# Patient Record
Sex: MALE | Race: BLACK OR AFRICAN AMERICAN | NOT HISPANIC OR LATINO | URBAN - METROPOLITAN AREA
[De-identification: names, ages, dates, MRNs, and addresses within clinical notes are randomized per-mention and may not be internally consistent; named-entity substitution may affect disease eponyms.]

---

## 2017-09-27 ENCOUNTER — EMERGENCY (EMERGENCY)
Facility: HOSPITAL | Age: 32
LOS: 1 days | Discharge: HOME | End: 2017-09-27
Attending: EMERGENCY MEDICINE | Admitting: EMERGENCY MEDICINE
Payer: SELF-PAY

## 2017-09-27 VITALS
TEMPERATURE: 98 F | DIASTOLIC BLOOD PRESSURE: 86 MMHG | RESPIRATION RATE: 16 BRPM | OXYGEN SATURATION: 100 % | HEART RATE: 56 BPM | SYSTOLIC BLOOD PRESSURE: 124 MMHG

## 2017-09-27 VITALS
SYSTOLIC BLOOD PRESSURE: 131 MMHG | HEART RATE: 55 BPM | DIASTOLIC BLOOD PRESSURE: 85 MMHG | OXYGEN SATURATION: 100 % | TEMPERATURE: 99 F | RESPIRATION RATE: 16 BRPM

## 2017-09-27 PROCEDURE — 99284 EMERGENCY DEPT VISIT MOD MDM: CPT

## 2017-09-27 RX ORDER — TETANUS TOXOID, REDUCED DIPHTHERIA TOXOID AND ACELLULAR PERTUSSIS VACCINE, ADSORBED 5; 2.5; 8; 8; 2.5 [IU]/.5ML; [IU]/.5ML; UG/.5ML; UG/.5ML; UG/.5ML
0.5 SUSPENSION INTRAMUSCULAR ONCE
Qty: 0 | Refills: 0 | Status: COMPLETED | OUTPATIENT
Start: 2017-09-27 | End: 2017-09-27

## 2017-09-27 RX ORDER — OFLOXACIN 0.3 %
1 DROPS OPHTHALMIC (EYE) ONCE
Qty: 0 | Refills: 0 | Status: COMPLETED | OUTPATIENT
Start: 2017-09-27 | End: 2017-09-27

## 2017-09-27 RX ADMIN — Medication 1 DROP(S): at 20:38

## 2017-09-27 NOTE — ED PROVIDER NOTE - PLAN OF CARE
Instill 1 to 2 drops in affected eye(s) every 30 minutes while awake and every 4 to 6 hours after retiring for the first 2 days.  Follow up with opthalmology TOMORROW 9/28 at [address] [place] [address] [phone]  Rest, drink plenty of fluids.  Advance activity as tolerated.  Continue all previously prescribed medications as directed. You can use motrin 600mg every 6-8 hours for pain or fever, and/or Tylenol 650 mg every 4 hours for pain/fever. Follow up with your primary care physician in 48-72 hours- bring copies of your results.  Return to the emergency department for chest pain, shortness of breath, dizziness, or worsening, concerning, or persistent symptoms.

## 2017-09-27 NOTE — ED PROVIDER NOTE - PROGRESS NOTE DETAILS
lady olvera: spoke with opthosherie, no difference whether removed tonight in ED or tomorrow in clinic, will see first thing 9 am tomorrow in clinic will dc with abx drops.

## 2017-09-27 NOTE — ED PROVIDER NOTE - CARE PLAN
Principal Discharge DX:	Foreign body in eye  Instructions for follow-up, activity and diet:	Instill 1 to 2 drops in affected eye(s) every 30 minutes while awake and every 4 to 6 hours after retiring for the first 2 days.  Follow up with opthalmology TOMORROW 9/28 at [address] [place] [address] [phone]  Rest, drink plenty of fluids.  Advance activity as tolerated.  Continue all previously prescribed medications as directed. You can use motrin 600mg every 6-8 hours for pain or fever, and/or Tylenol 650 mg every 4 hours for pain/fever. Follow up with your primary care physician in 48-72 hours- bring copies of your results.  Return to the emergency department for chest pain, shortness of breath, dizziness, or worsening, concerning, or persistent symptoms.

## 2017-09-27 NOTE — ED PROVIDER NOTE - OBJECTIVE STATEMENT
31 y/o male, with no significant PMHx, presents to the ED c/o b/l eye pain x 10 days with sensitivity to sun light. No glasses nor contacts use. 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 Notes not directly looking at the light but admits to not wearing welding glasses. States Sxs starting after rubbing his eyes, 10 days ago. Denies fever, chills, N/V, discharge, and any other complaints.  Accepted HIV testing. 31 y/o male, with no significant PMHx, presents to the ED c/o right eye pain x 10 days with sensitivity to sun light. No glasses nor contacts use. 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 Notes not directly looking at the light but admits to not wearing welding glasses. States Sxs starting after rubbing his eyes, 10 days ago. Denies fever, chills, N/V, discharge, and any other complaints.  Accepted HIV testing. 31 y/o male, with no significant PMHx, presents to the ED c/o right eye pain x 10 days with sensitivity to sun light. No glasses nor contacts use. 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 Notes not directly looking at the light but admits to not wearing welding glasses. States Sxs starting after rubbing his eyes, 10 days ago. Denies fever, chills, N/V, discharge, and any other complaints. Last Tetanus unknown.  Accepted HIV testing.

## 2017-09-28 ENCOUNTER — APPOINTMENT (OUTPATIENT)
Dept: OPHTHALMOLOGY | Facility: CLINIC | Age: 32
End: 2017-09-28

## 2017-10-02 PROBLEM — Z00.00 ENCOUNTER FOR PREVENTIVE HEALTH EXAMINATION: Status: ACTIVE | Noted: 2017-10-02

## 2022-08-24 NOTE — ED PROVIDER NOTE - CPE EDP EYES NORM
- - - Clofazimine Pregnancy And Lactation Text: This medication is Pregnancy Category C and isn't considered safe during pregnancy. It is excreted in breast milk.